# Patient Record
Sex: FEMALE | Race: WHITE | NOT HISPANIC OR LATINO | URBAN - METROPOLITAN AREA
[De-identification: names, ages, dates, MRNs, and addresses within clinical notes are randomized per-mention and may not be internally consistent; named-entity substitution may affect disease eponyms.]

---

## 2023-06-05 ENCOUNTER — INPATIENT (INPATIENT)
Facility: HOSPITAL | Age: 43
LOS: 3 days | Discharge: HOME | DRG: 872 | End: 2023-06-09
Attending: SURGERY | Admitting: SURGERY
Payer: COMMERCIAL

## 2023-06-05 VITALS
HEIGHT: 64 IN | DIASTOLIC BLOOD PRESSURE: 82 MMHG | WEIGHT: 210.1 LBS | OXYGEN SATURATION: 98 % | SYSTOLIC BLOOD PRESSURE: 127 MMHG | RESPIRATION RATE: 18 BRPM | HEART RATE: 117 BPM | TEMPERATURE: 101 F

## 2023-06-05 PROCEDURE — 99285 EMERGENCY DEPT VISIT HI MDM: CPT

## 2023-06-06 DIAGNOSIS — K57.92 DIVERTICULITIS OF INTESTINE, PART UNSPECIFIED, WITHOUT PERFORATION OR ABSCESS WITHOUT BLEEDING: ICD-10-CM

## 2023-06-06 LAB
ALBUMIN SERPL ELPH-MCNC: 3.7 G/DL — SIGNIFICANT CHANGE UP (ref 3.3–5)
ALP SERPL-CCNC: 64 U/L — SIGNIFICANT CHANGE UP (ref 40–120)
ALT FLD-CCNC: 16 U/L — SIGNIFICANT CHANGE UP (ref 10–45)
ANION GAP SERPL CALC-SCNC: 12 MMOL/L — SIGNIFICANT CHANGE UP (ref 5–17)
APPEARANCE UR: CLEAR — SIGNIFICANT CHANGE UP
AST SERPL-CCNC: 16 U/L — SIGNIFICANT CHANGE UP (ref 10–40)
BACTERIA # UR AUTO: NEGATIVE — SIGNIFICANT CHANGE UP
BASOPHILS # BLD AUTO: 0 K/UL — SIGNIFICANT CHANGE UP (ref 0–0.2)
BASOPHILS NFR BLD AUTO: 0 % — SIGNIFICANT CHANGE UP (ref 0–2)
BILIRUB SERPL-MCNC: 0.4 MG/DL — SIGNIFICANT CHANGE UP (ref 0.2–1.2)
BILIRUB UR-MCNC: NEGATIVE — SIGNIFICANT CHANGE UP
BUN SERPL-MCNC: 9 MG/DL — SIGNIFICANT CHANGE UP (ref 7–23)
CALCIUM SERPL-MCNC: 8.5 MG/DL — SIGNIFICANT CHANGE UP (ref 8.4–10.5)
CHLORIDE SERPL-SCNC: 104 MMOL/L — SIGNIFICANT CHANGE UP (ref 96–108)
CO2 SERPL-SCNC: 20 MMOL/L — LOW (ref 22–31)
COLOR SPEC: YELLOW — SIGNIFICANT CHANGE UP
CREAT SERPL-MCNC: 0.65 MG/DL — SIGNIFICANT CHANGE UP (ref 0.5–1.3)
DIFF PNL FLD: NEGATIVE — SIGNIFICANT CHANGE UP
EGFR: 112 ML/MIN/1.73M2 — SIGNIFICANT CHANGE UP
EOSINOPHIL # BLD AUTO: 0 K/UL — SIGNIFICANT CHANGE UP (ref 0–0.5)
EOSINOPHIL NFR BLD AUTO: 0 % — SIGNIFICANT CHANGE UP (ref 0–6)
EPI CELLS # UR: 3 /HPF — SIGNIFICANT CHANGE UP
GLUCOSE SERPL-MCNC: 110 MG/DL — HIGH (ref 70–99)
GLUCOSE UR QL: NEGATIVE — SIGNIFICANT CHANGE UP
HCG SERPL-ACNC: <2 MIU/ML — SIGNIFICANT CHANGE UP
HCT VFR BLD CALC: 37.1 % — SIGNIFICANT CHANGE UP (ref 34.5–45)
HGB BLD-MCNC: 12.4 G/DL — SIGNIFICANT CHANGE UP (ref 11.5–15.5)
HYALINE CASTS # UR AUTO: 0 /LPF — SIGNIFICANT CHANGE UP (ref 0–2)
KETONES UR-MCNC: ABNORMAL
LEUKOCYTE ESTERASE UR-ACNC: NEGATIVE — SIGNIFICANT CHANGE UP
LYMPHOCYTES # BLD AUTO: 16.4 % — SIGNIFICANT CHANGE UP (ref 13–44)
LYMPHOCYTES # BLD AUTO: 2.66 K/UL — SIGNIFICANT CHANGE UP (ref 1–3.3)
MANUAL SMEAR VERIFICATION: SIGNIFICANT CHANGE UP
MCHC RBC-ENTMCNC: 31.2 PG — SIGNIFICANT CHANGE UP (ref 27–34)
MCHC RBC-ENTMCNC: 33.4 GM/DL — SIGNIFICANT CHANGE UP (ref 32–36)
MCV RBC AUTO: 93.2 FL — SIGNIFICANT CHANGE UP (ref 80–100)
MONOCYTES # BLD AUTO: 1.54 K/UL — HIGH (ref 0–0.9)
MONOCYTES NFR BLD AUTO: 9.5 % — SIGNIFICANT CHANGE UP (ref 2–14)
NEUTROPHILS # BLD AUTO: 12.01 K/UL — HIGH (ref 1.8–7.4)
NEUTROPHILS NFR BLD AUTO: 74.1 % — SIGNIFICANT CHANGE UP (ref 43–77)
NITRITE UR-MCNC: NEGATIVE — SIGNIFICANT CHANGE UP
PH UR: 5.5 — SIGNIFICANT CHANGE UP (ref 5–8)
PLAT MORPH BLD: NORMAL — SIGNIFICANT CHANGE UP
PLATELET # BLD AUTO: 225 K/UL — SIGNIFICANT CHANGE UP (ref 150–400)
POTASSIUM SERPL-MCNC: 3.5 MMOL/L — SIGNIFICANT CHANGE UP (ref 3.5–5.3)
POTASSIUM SERPL-SCNC: 3.5 MMOL/L — SIGNIFICANT CHANGE UP (ref 3.5–5.3)
PROT SERPL-MCNC: 6.7 G/DL — SIGNIFICANT CHANGE UP (ref 6–8.3)
PROT UR-MCNC: NEGATIVE — SIGNIFICANT CHANGE UP
RBC # BLD: 3.98 M/UL — SIGNIFICANT CHANGE UP (ref 3.8–5.2)
RBC # FLD: 13.2 % — SIGNIFICANT CHANGE UP (ref 10.3–14.5)
RBC BLD AUTO: NORMAL — SIGNIFICANT CHANGE UP
RBC CASTS # UR COMP ASSIST: 1 /HPF — SIGNIFICANT CHANGE UP (ref 0–4)
SODIUM SERPL-SCNC: 136 MMOL/L — SIGNIFICANT CHANGE UP (ref 135–145)
SP GR SPEC: 1.01 — SIGNIFICANT CHANGE UP (ref 1.01–1.02)
UROBILINOGEN FLD QL: NEGATIVE — SIGNIFICANT CHANGE UP
WBC # BLD: 16.21 K/UL — HIGH (ref 3.8–10.5)
WBC # FLD AUTO: 16.21 K/UL — HIGH (ref 3.8–10.5)
WBC UR QL: 7 /HPF — HIGH (ref 0–5)

## 2023-06-06 PROCEDURE — 74177 CT ABD & PELVIS W/CONTRAST: CPT | Mod: 26,MA

## 2023-06-06 PROCEDURE — 99223 1ST HOSP IP/OBS HIGH 75: CPT

## 2023-06-06 RX ORDER — DEXTROSE MONOHYDRATE, SODIUM CHLORIDE, AND POTASSIUM CHLORIDE 50; .745; 4.5 G/1000ML; G/1000ML; G/1000ML
1000 INJECTION, SOLUTION INTRAVENOUS
Refills: 0 | Status: DISCONTINUED | OUTPATIENT
Start: 2023-06-06 | End: 2023-06-09

## 2023-06-06 RX ORDER — ACETAMINOPHEN 500 MG
1000 TABLET ORAL ONCE
Refills: 0 | Status: COMPLETED | OUTPATIENT
Start: 2023-06-06 | End: 2023-06-06

## 2023-06-06 RX ORDER — SODIUM CHLORIDE 9 MG/ML
1000 INJECTION, SOLUTION INTRAVENOUS ONCE
Refills: 0 | Status: COMPLETED | OUTPATIENT
Start: 2023-06-06 | End: 2023-06-06

## 2023-06-06 RX ORDER — ACETAMINOPHEN 500 MG
1000 TABLET ORAL EVERY 6 HOURS
Refills: 0 | Status: COMPLETED | OUTPATIENT
Start: 2023-06-06 | End: 2023-06-07

## 2023-06-06 RX ORDER — OXYCODONE HYDROCHLORIDE 5 MG/1
5 TABLET ORAL EVERY 4 HOURS
Refills: 0 | Status: DISCONTINUED | OUTPATIENT
Start: 2023-06-06 | End: 2023-06-07

## 2023-06-06 RX ORDER — KETOROLAC TROMETHAMINE 30 MG/ML
15 SYRINGE (ML) INJECTION ONCE
Refills: 0 | Status: DISCONTINUED | OUTPATIENT
Start: 2023-06-06 | End: 2023-06-06

## 2023-06-06 RX ORDER — OXYCODONE HYDROCHLORIDE 5 MG/1
2.5 TABLET ORAL EVERY 4 HOURS
Refills: 0 | Status: DISCONTINUED | OUTPATIENT
Start: 2023-06-06 | End: 2023-06-07

## 2023-06-06 RX ORDER — PIPERACILLIN AND TAZOBACTAM 4; .5 G/20ML; G/20ML
3.38 INJECTION, POWDER, LYOPHILIZED, FOR SOLUTION INTRAVENOUS EVERY 8 HOURS
Refills: 0 | Status: DISCONTINUED | OUTPATIENT
Start: 2023-06-06 | End: 2023-06-09

## 2023-06-06 RX ORDER — AMPICILLIN SODIUM AND SULBACTAM SODIUM 250; 125 MG/ML; MG/ML
3 INJECTION, POWDER, FOR SUSPENSION INTRAMUSCULAR; INTRAVENOUS ONCE
Refills: 0 | Status: COMPLETED | OUTPATIENT
Start: 2023-06-06 | End: 2023-06-06

## 2023-06-06 RX ADMIN — Medication 15 MILLIGRAM(S): at 06:40

## 2023-06-06 RX ADMIN — PIPERACILLIN AND TAZOBACTAM 25 GRAM(S): 4; .5 INJECTION, POWDER, LYOPHILIZED, FOR SOLUTION INTRAVENOUS at 14:04

## 2023-06-06 RX ADMIN — Medication 15 MILLIGRAM(S): at 10:09

## 2023-06-06 RX ADMIN — OXYCODONE HYDROCHLORIDE 5 MILLIGRAM(S): 5 TABLET ORAL at 21:50

## 2023-06-06 RX ADMIN — Medication 400 MILLIGRAM(S): at 13:37

## 2023-06-06 RX ADMIN — Medication 400 MILLIGRAM(S): at 03:59

## 2023-06-06 RX ADMIN — Medication 15 MILLIGRAM(S): at 07:20

## 2023-06-06 RX ADMIN — AMPICILLIN SODIUM AND SULBACTAM SODIUM 3 GRAM(S): 250; 125 INJECTION, POWDER, FOR SUSPENSION INTRAMUSCULAR; INTRAVENOUS at 10:36

## 2023-06-06 RX ADMIN — Medication 400 MILLIGRAM(S): at 23:37

## 2023-06-06 RX ADMIN — AMPICILLIN SODIUM AND SULBACTAM SODIUM 200 GRAM(S): 250; 125 INJECTION, POWDER, FOR SUSPENSION INTRAMUSCULAR; INTRAVENOUS at 09:47

## 2023-06-06 RX ADMIN — PIPERACILLIN AND TAZOBACTAM 25 GRAM(S): 4; .5 INJECTION, POWDER, LYOPHILIZED, FOR SOLUTION INTRAVENOUS at 23:35

## 2023-06-06 RX ADMIN — Medication 15 MILLIGRAM(S): at 10:35

## 2023-06-06 RX ADMIN — DEXTROSE MONOHYDRATE, SODIUM CHLORIDE, AND POTASSIUM CHLORIDE 100 MILLILITER(S): 50; .745; 4.5 INJECTION, SOLUTION INTRAVENOUS at 21:19

## 2023-06-06 RX ADMIN — DEXTROSE MONOHYDRATE, SODIUM CHLORIDE, AND POTASSIUM CHLORIDE 100 MILLILITER(S): 50; .745; 4.5 INJECTION, SOLUTION INTRAVENOUS at 16:32

## 2023-06-06 RX ADMIN — SODIUM CHLORIDE 1000 MILLILITER(S): 9 INJECTION, SOLUTION INTRAVENOUS at 03:58

## 2023-06-06 RX ADMIN — OXYCODONE HYDROCHLORIDE 5 MILLIGRAM(S): 5 TABLET ORAL at 21:20

## 2023-06-06 NOTE — ED ADULT NURSE NOTE - OBJECTIVE STATEMENT
Pt is 42 y/o female, presenting to the ED c/o pelvic pain. Pt reports that she has been having pelvic pain over the past couple of days, worsening today prompting ED visit. As per pt, she has had difficultly walking, sitting, and doing daily tasks due to the pain. Pt also endorses fevers of tMax 101.6. As per pt, she has been taking advil @ home for pain w/ no relief. Nikko IUD placed in 2020, pt endorses regular periods and denies pertinent PMH. Upon assessment, pt AxOx3, sitting up in stretcher and speaking in full sentences. Breathing spontaneously and unlabored, >95% RA. Abdomen soft and nontender to palpation. Pt moves all extremities w/ = strength. Skin is warm, dry, and intact w/ + peripheral pulses. Pt denies SOB, chest pain, n/v/d, dizziness, vision changes, chills, and fever. Safety and comfort measures provided- bed in lowest position, locked, and blanket given.

## 2023-06-06 NOTE — H&P ADULT - NSHPPHYSICALEXAM_GEN_ALL_CORE
Gen: appears well groomed  Lungs: Non labored breathing  Abd: Soft   Ext: Moving all extremities spontaneously  Neuro: A&Ox3 Gen: appears well groomed, well nourished  Lungs: Non labored breathing  Abd: Soft, tender to LLQ, non distended  Ext: Moving all extremities spontaneously  Neuro: A&Ox3

## 2023-06-06 NOTE — ED PROVIDER NOTE - OBJECTIVE STATEMENT
43-year-old female with no known past medical history presents emergency department for 2 days of lower abdominal pain.  Patient states pain is gotten worse over the last 2 days, sharp and cramping in nature.  Pain worse with lying down, walking and coughing.  Patient also endorsing chills and fevers over the last 2 days.  Patient states she has copper IUD that was placed about 3 years ago and she has had abnormal periods since, LMP about 5 weeks ago.  Patient sexually active with 1 partner.  Denies history STIs or known exposures to STIs.  Patient denies headache, vision changes, chest pain, trouble breathing/vomiting, diarrhea/constipation, dysuria, hematuria, vaginal bleeding or discharge.

## 2023-06-06 NOTE — ED PROVIDER NOTE - PHYSICAL EXAMINATION
Constitutional: VS reviewed. Alert and orientedx3, pt appears uncomfortable.   HEENT: Atraumatic, EOMI  CV: Tachycardic  Lungs: Clear and equal bilaterally, no wheezes, rales or crackles  Abdomen: Soft, nondistended, TTP in b/l lower quadrants w/o rebound or guarding   MSK: No deformities  Skin: Warm and dry. As visualized no rashes, lesions, bruising or erythema  Neuro: Appears nonfocal  Lymph: No pitting edema in extremities.

## 2023-06-06 NOTE — ED PROVIDER NOTE - PROGRESS NOTE DETAILS
CT scan shows acute diverticulitis with microperforations.  Unasyn started.  Surgery consulted, will see patient.

## 2023-06-06 NOTE — ED PROVIDER NOTE - ATTENDING CONTRIBUTION TO CARE
Patient evaluated for lower abdominal pain.  Patient had CT scans that showed diverticulitis with microperforation.  Patient received IV antibiotics, IV fluids, IV pain meds.  Patient remained hemodynamically stable in the ED.  Patient was evaluated and admitted by general surgery for further care.

## 2023-06-06 NOTE — H&P ADULT - ASSESSMENT
43-year-old female with no pmhx presents to ER with chief complaint of 2 days of lower abdominal pain. CT c/w with acute diverticulitis with microperforation.    - IV Zosyn   - NPO/IVF  - Follow AM labs   - Follow abdominal exams    .[phone]  [facility]    43-year-old female with no pmhx presents to ER with chief complaint of 2 days of lower abdominal pain. CT c/w with acute diverticulitis with microperforation.    - IV Zosyn   - NPO/IVF  - Follow AM labs   - Follow abdominal exams    .[phone]  [facility]

## 2023-06-06 NOTE — ED ADULT NURSE NOTE - CAS EDP DISCH DISPOSITION ADMI
[facility] [place]/Surgery Cyclosporine Counseling:  I discussed with the patient the risks of cyclosporine including but not limited to hypertension, gingival hyperplasia,myelosuppression, immunosuppression, liver damage, kidney damage, neurotoxicity, lymphoma, and serious infections. The patient understands that monitoring is required including baseline blood pressure, CBC, CMP, lipid panel and uric acid, and then 1-2 times monthly CMP and blood pressure.

## 2023-06-06 NOTE — ED PROVIDER NOTE - CLINICAL SUMMARY MEDICAL DECISION MAKING FREE TEXT BOX
43-year-old female with no known past medical history presents emergency department for 2 days of worsening lower abdominal pain and fevers. Pain worse with laying flat, walking and coughing. + TTP of b/l lower quadrants without rebound or guarding. Nontoxic appearing. Febrile and tachycardic on arrival. Differentials include but not limited to ruptured ovarian torsion, TOA, appendicitis, PID. Plan for labs, CT, pain meds. Dispo pending lab and imaging.

## 2023-06-06 NOTE — H&P ADULT - NSHPLABSRESULTS_GEN_ALL_CORE
CT ABD/PELVIS    PROCEDURE DATE:  06/06/2023      INTERPRETATION:  CLINICAL INFORMATION: Pelvic pain with fevers.  FINDINGS:  LOWER CHEST: Within normal limits.    LIVER: Elongated appearance of the right lobe, which may be due to a   Riedel's lobe versus hepatomegaly.  BILE DUCTS: Normal caliber.  GALLBLADDER: Within normal limits.  SPLEEN: Within normal limits.  PANCREAS: Withinnormal limits.  ADRENALS: Within normal limits.  KIDNEYS/URETERS: Within normal limits.    BLADDER: Within normal limits.  REPRODUCTIVE ORGANS: Intrauterine device in place.    BOWEL: Sigmoid colonic diverticula. Wall thickening of the sigmoid colon   with surrounding fat stranding, which appears centered about an inflamed   diverticulum, concerning for acute diverticulitis. Foci of gas within the   adjacent sigmoid mesentery, concerning for microperforation. No   associated fluid collection.    Small hiatal hernia. No bowel obstruction. Appendix is normal.    PERITONEUM: No ascites. Small amount of free air within the sigmoid   mesentery.  VESSELS: Within normal limits.  RETROPERITONEUM/LYMPH NODES: No lymphadenopathy.  ABDOMINAL WALL: Tiny fat-containing umbilical hernia.  BONES: Mild degenerative changes.    IMPRESSION:  Acute sigmoid diverticulitis. Adjacent foci of free gas within the   sigmoid mesentery, concerning for microperforation. No associated   drainable fluid collection.      --- End of Report ---       LEEANNA GREY MD; Resident Radiologist  This document has been electronically signed.  DORIE IBARRA MD; Attending Radiologist  This document has been electronically signed. Jun 6 2023  8:26AM

## 2023-06-06 NOTE — H&P ADULT - HISTORY OF PRESENT ILLNESS
43-year-old female with no pmhx presents to ER with chief complaint of 2 days of lower abdominal pain.  Patient reports sharp and cramping in nature.  Pain worse with lying down, walking and coughing.  Patient also endorsing chills and fevers over the last 2 days.  Patient states she has copper IUD that was placed about 3 years ago and she has had abnormal periods since, LMP about 5 weeks ago.  Patient sexually active with 1 partner.  Denies history STIs or known exposures to STIs.  Patient denies headache, vision changes, chest pain, trouble breathing/vomiting, diarrhea/constipation, dysuria, hematuria, vaginal bleeding or discharge.

## 2023-06-07 LAB
ANION GAP SERPL CALC-SCNC: 11 MMOL/L — SIGNIFICANT CHANGE UP (ref 5–17)
BUN SERPL-MCNC: 6 MG/DL — LOW (ref 7–23)
CALCIUM SERPL-MCNC: 8.6 MG/DL — SIGNIFICANT CHANGE UP (ref 8.4–10.5)
CHLORIDE SERPL-SCNC: 104 MMOL/L — SIGNIFICANT CHANGE UP (ref 96–108)
CO2 SERPL-SCNC: 24 MMOL/L — SIGNIFICANT CHANGE UP (ref 22–31)
CREAT SERPL-MCNC: 0.69 MG/DL — SIGNIFICANT CHANGE UP (ref 0.5–1.3)
CULTURE RESULTS: SIGNIFICANT CHANGE UP
EGFR: 110 ML/MIN/1.73M2 — SIGNIFICANT CHANGE UP
GLUCOSE SERPL-MCNC: 96 MG/DL — SIGNIFICANT CHANGE UP (ref 70–99)
HCT VFR BLD CALC: 37.5 % — SIGNIFICANT CHANGE UP (ref 34.5–45)
HGB BLD-MCNC: 12.6 G/DL — SIGNIFICANT CHANGE UP (ref 11.5–15.5)
MAGNESIUM SERPL-MCNC: 2 MG/DL — SIGNIFICANT CHANGE UP (ref 1.6–2.6)
MCHC RBC-ENTMCNC: 31.3 PG — SIGNIFICANT CHANGE UP (ref 27–34)
MCHC RBC-ENTMCNC: 33.6 GM/DL — SIGNIFICANT CHANGE UP (ref 32–36)
MCV RBC AUTO: 93.3 FL — SIGNIFICANT CHANGE UP (ref 80–100)
NRBC # BLD: 0 /100 WBCS — SIGNIFICANT CHANGE UP (ref 0–0)
PHOSPHATE SERPL-MCNC: 3.3 MG/DL — SIGNIFICANT CHANGE UP (ref 2.5–4.5)
PLATELET # BLD AUTO: 222 K/UL — SIGNIFICANT CHANGE UP (ref 150–400)
POTASSIUM SERPL-MCNC: 4.3 MMOL/L — SIGNIFICANT CHANGE UP (ref 3.5–5.3)
POTASSIUM SERPL-SCNC: 4.3 MMOL/L — SIGNIFICANT CHANGE UP (ref 3.5–5.3)
RBC # BLD: 4.02 M/UL — SIGNIFICANT CHANGE UP (ref 3.8–5.2)
RBC # FLD: 13.2 % — SIGNIFICANT CHANGE UP (ref 10.3–14.5)
SODIUM SERPL-SCNC: 139 MMOL/L — SIGNIFICANT CHANGE UP (ref 135–145)
SPECIMEN SOURCE: SIGNIFICANT CHANGE UP
WBC # BLD: 10.15 K/UL — SIGNIFICANT CHANGE UP (ref 3.8–10.5)
WBC # FLD AUTO: 10.15 K/UL — SIGNIFICANT CHANGE UP (ref 3.8–10.5)

## 2023-06-07 PROCEDURE — 99232 SBSQ HOSP IP/OBS MODERATE 35: CPT

## 2023-06-07 RX ORDER — ENOXAPARIN SODIUM 100 MG/ML
40 INJECTION SUBCUTANEOUS EVERY 24 HOURS
Refills: 0 | Status: DISCONTINUED | OUTPATIENT
Start: 2023-06-07 | End: 2023-06-09

## 2023-06-07 RX ORDER — KETOROLAC TROMETHAMINE 30 MG/ML
15 SYRINGE (ML) INJECTION ONCE
Refills: 0 | Status: DISCONTINUED | OUTPATIENT
Start: 2023-06-07 | End: 2023-06-07

## 2023-06-07 RX ORDER — ACETAMINOPHEN 500 MG
975 TABLET ORAL EVERY 6 HOURS
Refills: 0 | Status: DISCONTINUED | OUTPATIENT
Start: 2023-06-07 | End: 2023-06-09

## 2023-06-07 RX ADMIN — ENOXAPARIN SODIUM 40 MILLIGRAM(S): 100 INJECTION SUBCUTANEOUS at 14:09

## 2023-06-07 RX ADMIN — Medication 400 MILLIGRAM(S): at 06:47

## 2023-06-07 RX ADMIN — DEXTROSE MONOHYDRATE, SODIUM CHLORIDE, AND POTASSIUM CHLORIDE 100 MILLILITER(S): 50; .745; 4.5 INJECTION, SOLUTION INTRAVENOUS at 17:13

## 2023-06-07 RX ADMIN — Medication 1000 MILLIGRAM(S): at 07:17

## 2023-06-07 RX ADMIN — Medication 15 MILLIGRAM(S): at 21:10

## 2023-06-07 RX ADMIN — Medication 15 MILLIGRAM(S): at 20:33

## 2023-06-07 RX ADMIN — PIPERACILLIN AND TAZOBACTAM 25 GRAM(S): 4; .5 INJECTION, POWDER, LYOPHILIZED, FOR SOLUTION INTRAVENOUS at 06:45

## 2023-06-07 RX ADMIN — PIPERACILLIN AND TAZOBACTAM 25 GRAM(S): 4; .5 INJECTION, POWDER, LYOPHILIZED, FOR SOLUTION INTRAVENOUS at 21:46

## 2023-06-07 RX ADMIN — Medication 1000 MILLIGRAM(S): at 12:06

## 2023-06-07 RX ADMIN — PIPERACILLIN AND TAZOBACTAM 25 GRAM(S): 4; .5 INJECTION, POWDER, LYOPHILIZED, FOR SOLUTION INTRAVENOUS at 14:09

## 2023-06-07 RX ADMIN — Medication 1000 MILLIGRAM(S): at 18:17

## 2023-06-07 RX ADMIN — Medication 400 MILLIGRAM(S): at 18:02

## 2023-06-07 RX ADMIN — Medication 1000 MILLIGRAM(S): at 00:07

## 2023-06-07 RX ADMIN — Medication 400 MILLIGRAM(S): at 11:51

## 2023-06-07 NOTE — PATIENT PROFILE ADULT - HAVE YOU RECEIVED AT LEAST TWO PFIZER AND/OR MODERNA VACCINATIONS (IN ANY COMBINATION) AND/OR ONE JOHNSON & JOHNSON VACCINATION?
You can access the FollowMyHealth Patient Portal offered by [facility] by registering at the following website: [email or web address]. By joining [employer]’s FollowMyHealth portal, you will also be able to view your health information using other applications (apps) compatible with our system.
Yes

## 2023-06-07 NOTE — PROGRESS NOTE ADULT - ASSESSMENT
43-year-old female with no pmhx presents to ER with chief complaint of 2 days of lower abdominal pain. CT c/w with acute diverticulitis with microperforation.    Plan:  - IV Zosyn   - NPO/IVF  - Follow AM labs   - Follow abdominal exams      [facility]   [id number] 43-year-old female with no pmhx presents to ER with chief complaint of 2 days of lower abdominal pain. CT c/w with acute diverticulitis with microperforation.    Plan:  - IV Zosyn   - advance to sips/chips  - Follow AM labs and replete prn   - Follow abdominal exams      [facility]   [id number]

## 2023-06-08 LAB
ANION GAP SERPL CALC-SCNC: 14 MMOL/L — SIGNIFICANT CHANGE UP (ref 5–17)
BUN SERPL-MCNC: <4 MG/DL — LOW (ref 7–23)
CALCIUM SERPL-MCNC: 9 MG/DL — SIGNIFICANT CHANGE UP (ref 8.4–10.5)
CHLORIDE SERPL-SCNC: 106 MMOL/L — SIGNIFICANT CHANGE UP (ref 96–108)
CO2 SERPL-SCNC: 20 MMOL/L — LOW (ref 22–31)
CREAT SERPL-MCNC: 0.68 MG/DL — SIGNIFICANT CHANGE UP (ref 0.5–1.3)
EGFR: 111 ML/MIN/1.73M2 — SIGNIFICANT CHANGE UP
GLUCOSE SERPL-MCNC: 107 MG/DL — HIGH (ref 70–99)
HCT VFR BLD CALC: 36.8 % — SIGNIFICANT CHANGE UP (ref 34.5–45)
HGB BLD-MCNC: 12.4 G/DL — SIGNIFICANT CHANGE UP (ref 11.5–15.5)
MAGNESIUM SERPL-MCNC: 2 MG/DL — SIGNIFICANT CHANGE UP (ref 1.6–2.6)
MCHC RBC-ENTMCNC: 31.2 PG — SIGNIFICANT CHANGE UP (ref 27–34)
MCHC RBC-ENTMCNC: 33.7 GM/DL — SIGNIFICANT CHANGE UP (ref 32–36)
MCV RBC AUTO: 92.7 FL — SIGNIFICANT CHANGE UP (ref 80–100)
NRBC # BLD: 0 /100 WBCS — SIGNIFICANT CHANGE UP (ref 0–0)
PHOSPHATE SERPL-MCNC: 3.4 MG/DL — SIGNIFICANT CHANGE UP (ref 2.5–4.5)
PLATELET # BLD AUTO: 264 K/UL — SIGNIFICANT CHANGE UP (ref 150–400)
POTASSIUM SERPL-MCNC: 3.7 MMOL/L — SIGNIFICANT CHANGE UP (ref 3.5–5.3)
POTASSIUM SERPL-SCNC: 3.7 MMOL/L — SIGNIFICANT CHANGE UP (ref 3.5–5.3)
RBC # BLD: 3.97 M/UL — SIGNIFICANT CHANGE UP (ref 3.8–5.2)
RBC # FLD: 13.1 % — SIGNIFICANT CHANGE UP (ref 10.3–14.5)
SODIUM SERPL-SCNC: 140 MMOL/L — SIGNIFICANT CHANGE UP (ref 135–145)
WBC # BLD: 8.69 K/UL — SIGNIFICANT CHANGE UP (ref 3.8–10.5)
WBC # FLD AUTO: 8.69 K/UL — SIGNIFICANT CHANGE UP (ref 3.8–10.5)

## 2023-06-08 PROCEDURE — 99232 SBSQ HOSP IP/OBS MODERATE 35: CPT

## 2023-06-08 RX ORDER — LACTOBACILLUS ACIDOPHILUS 100MM CELL
1 CAPSULE ORAL DAILY
Refills: 0 | Status: DISCONTINUED | OUTPATIENT
Start: 2023-06-08 | End: 2023-06-09

## 2023-06-08 RX ORDER — POTASSIUM CHLORIDE 20 MEQ
20 PACKET (EA) ORAL ONCE
Refills: 0 | Status: COMPLETED | OUTPATIENT
Start: 2023-06-08 | End: 2023-06-08

## 2023-06-08 RX ADMIN — PIPERACILLIN AND TAZOBACTAM 25 GRAM(S): 4; .5 INJECTION, POWDER, LYOPHILIZED, FOR SOLUTION INTRAVENOUS at 21:17

## 2023-06-08 RX ADMIN — ENOXAPARIN SODIUM 40 MILLIGRAM(S): 100 INJECTION SUBCUTANEOUS at 14:58

## 2023-06-08 RX ADMIN — PIPERACILLIN AND TAZOBACTAM 25 GRAM(S): 4; .5 INJECTION, POWDER, LYOPHILIZED, FOR SOLUTION INTRAVENOUS at 14:57

## 2023-06-08 RX ADMIN — PIPERACILLIN AND TAZOBACTAM 25 GRAM(S): 4; .5 INJECTION, POWDER, LYOPHILIZED, FOR SOLUTION INTRAVENOUS at 06:16

## 2023-06-08 RX ADMIN — DEXTROSE MONOHYDRATE, SODIUM CHLORIDE, AND POTASSIUM CHLORIDE 100 MILLILITER(S): 50; .745; 4.5 INJECTION, SOLUTION INTRAVENOUS at 14:58

## 2023-06-08 RX ADMIN — DEXTROSE MONOHYDRATE, SODIUM CHLORIDE, AND POTASSIUM CHLORIDE 100 MILLILITER(S): 50; .745; 4.5 INJECTION, SOLUTION INTRAVENOUS at 21:17

## 2023-06-08 RX ADMIN — Medication 20 MILLIEQUIVALENT(S): at 15:00

## 2023-06-08 NOTE — PROGRESS NOTE ADULT - ATTENDING COMMENTS
Date of Service 6/7/23    Agree with E&M as outlined above.    Diverticulitis w/perf, IV abx observation
Agree with E&M above    CLinical improvement of her Diverticulitis

## 2023-06-08 NOTE — PROGRESS NOTE ADULT - ASSESSMENT
43-year-old female with no pmhx presents to ER with chief complaint of 2 days of lower abdominal pain. CT c/w with acute diverticulitis with microperforation.    Plan:  - IV Zosyn   -  tolerating CLD- might advance diet if tolerates breakfast   - Follow AM labs and replete prn   - Follow abdominal exams      [facility]   [id number]

## 2023-06-08 NOTE — PROGRESS NOTE ADULT - TIME BILLING
Evaluation, chart review, treatment, care coordination
Evaluation, chart review, care coordination, treatment

## 2023-06-09 ENCOUNTER — TRANSCRIPTION ENCOUNTER (OUTPATIENT)
Age: 43
End: 2023-06-09

## 2023-06-09 VITALS
HEART RATE: 53 BPM | SYSTOLIC BLOOD PRESSURE: 113 MMHG | TEMPERATURE: 98 F | OXYGEN SATURATION: 98 % | DIASTOLIC BLOOD PRESSURE: 79 MMHG | RESPIRATION RATE: 18 BRPM

## 2023-06-09 LAB
ANION GAP SERPL CALC-SCNC: 11 MMOL/L — SIGNIFICANT CHANGE UP (ref 5–17)
BUN SERPL-MCNC: 5 MG/DL — LOW (ref 7–23)
CALCIUM SERPL-MCNC: 9 MG/DL — SIGNIFICANT CHANGE UP (ref 8.4–10.5)
CHLORIDE SERPL-SCNC: 105 MMOL/L — SIGNIFICANT CHANGE UP (ref 96–108)
CO2 SERPL-SCNC: 24 MMOL/L — SIGNIFICANT CHANGE UP (ref 22–31)
CREAT SERPL-MCNC: 0.85 MG/DL — SIGNIFICANT CHANGE UP (ref 0.5–1.3)
EGFR: 87 ML/MIN/1.73M2 — SIGNIFICANT CHANGE UP
GLUCOSE SERPL-MCNC: 106 MG/DL — HIGH (ref 70–99)
HCT VFR BLD CALC: 36.8 % — SIGNIFICANT CHANGE UP (ref 34.5–45)
HGB BLD-MCNC: 12.3 G/DL — SIGNIFICANT CHANGE UP (ref 11.5–15.5)
MAGNESIUM SERPL-MCNC: 2 MG/DL — SIGNIFICANT CHANGE UP (ref 1.6–2.6)
MCHC RBC-ENTMCNC: 31.5 PG — SIGNIFICANT CHANGE UP (ref 27–34)
MCHC RBC-ENTMCNC: 33.4 GM/DL — SIGNIFICANT CHANGE UP (ref 32–36)
MCV RBC AUTO: 94.4 FL — SIGNIFICANT CHANGE UP (ref 80–100)
NRBC # BLD: 0 /100 WBCS — SIGNIFICANT CHANGE UP (ref 0–0)
PHOSPHATE SERPL-MCNC: 3.8 MG/DL — SIGNIFICANT CHANGE UP (ref 2.5–4.5)
PLATELET # BLD AUTO: 253 K/UL — SIGNIFICANT CHANGE UP (ref 150–400)
POTASSIUM SERPL-MCNC: 4.1 MMOL/L — SIGNIFICANT CHANGE UP (ref 3.5–5.3)
POTASSIUM SERPL-SCNC: 4.1 MMOL/L — SIGNIFICANT CHANGE UP (ref 3.5–5.3)
RBC # BLD: 3.9 M/UL — SIGNIFICANT CHANGE UP (ref 3.8–5.2)
RBC # FLD: 13 % — SIGNIFICANT CHANGE UP (ref 10.3–14.5)
SODIUM SERPL-SCNC: 140 MMOL/L — SIGNIFICANT CHANGE UP (ref 135–145)
WBC # BLD: 9.65 K/UL — SIGNIFICANT CHANGE UP (ref 3.8–10.5)
WBC # FLD AUTO: 9.65 K/UL — SIGNIFICANT CHANGE UP (ref 3.8–10.5)

## 2023-06-09 PROCEDURE — 85027 COMPLETE CBC AUTOMATED: CPT

## 2023-06-09 PROCEDURE — 80053 COMPREHEN METABOLIC PANEL: CPT

## 2023-06-09 PROCEDURE — 74177 CT ABD & PELVIS W/CONTRAST: CPT | Mod: MA

## 2023-06-09 PROCEDURE — 87086 URINE CULTURE/COLONY COUNT: CPT

## 2023-06-09 PROCEDURE — 85025 COMPLETE CBC W/AUTO DIFF WBC: CPT

## 2023-06-09 PROCEDURE — 99285 EMERGENCY DEPT VISIT HI MDM: CPT

## 2023-06-09 PROCEDURE — 81001 URINALYSIS AUTO W/SCOPE: CPT

## 2023-06-09 PROCEDURE — 99231 SBSQ HOSP IP/OBS SF/LOW 25: CPT

## 2023-06-09 PROCEDURE — 83735 ASSAY OF MAGNESIUM: CPT

## 2023-06-09 PROCEDURE — 96365 THER/PROPH/DIAG IV INF INIT: CPT

## 2023-06-09 PROCEDURE — 84100 ASSAY OF PHOSPHORUS: CPT

## 2023-06-09 PROCEDURE — 96376 TX/PRO/DX INJ SAME DRUG ADON: CPT

## 2023-06-09 PROCEDURE — 80048 BASIC METABOLIC PNL TOTAL CA: CPT

## 2023-06-09 PROCEDURE — 96375 TX/PRO/DX INJ NEW DRUG ADDON: CPT

## 2023-06-09 PROCEDURE — 36415 COLL VENOUS BLD VENIPUNCTURE: CPT

## 2023-06-09 PROCEDURE — 84702 CHORIONIC GONADOTROPIN TEST: CPT

## 2023-06-09 RX ORDER — ACETAMINOPHEN 500 MG
3 TABLET ORAL
Qty: 0 | Refills: 0 | DISCHARGE
Start: 2023-06-09

## 2023-06-09 RX ORDER — LACTOBACILLUS ACIDOPHILUS 100MM CELL
1 CAPSULE ORAL
Qty: 14 | Refills: 0
Start: 2023-06-09 | End: 2023-06-22

## 2023-06-09 RX ADMIN — Medication 975 MILLIGRAM(S): at 09:57

## 2023-06-09 RX ADMIN — PIPERACILLIN AND TAZOBACTAM 25 GRAM(S): 4; .5 INJECTION, POWDER, LYOPHILIZED, FOR SOLUTION INTRAVENOUS at 05:05

## 2023-06-09 NOTE — DISCHARGE NOTE NURSING/CASE MANAGEMENT/SOCIAL WORK - PATIENT PORTAL LINK FT
You can access the FollowMyHealth Patient Portal offered by [facility] by registering at the following website: [email or web address]. By joining [employer]’s FollowMyHealth portal, you will also be able to view your health information using other applications (apps) compatible with our system.

## 2023-06-09 NOTE — PROGRESS NOTE ADULT - ASSESSMENT
abdominal pain. CT c/w with acute diverticulitis with microperforation.    Plan:  - IV Zosyn   -  tolerating CLD- might advance diet if tolerates breakfast   - Follow AM labs and replete prn   - Discharge home today      [facility]   [id number]

## 2023-06-09 NOTE — DISCHARGE NOTE PROVIDER - NSDCMRMEDTOKEN_GEN_ALL_CORE_FT
acetaminophen 325 mg oral tablet: 3 tab(s) orally every 6 hours As needed Temp greater or equal to 38C (100.4F), Mild Pain (1 - 3)

## 2023-06-09 NOTE — DISCHARGE NOTE PROVIDER - HOSPITAL COURSE
43-year-old female with no pmhx presents to ER with chief complaint of 2 days of lower abdominal pain.  Patient reports sharp and cramping in nature.  Pain worse with lying down, walking and coughing.  Patient also endorsing chills and fevers over the last 2 days.  Patient states she has copper IUD that was placed about 3 years ago and she has had abnormal periods since, LMP about 5 weeks ago.  Patient sexually active with 1 partner.  Denies history STIs or known exposures to STIs.  Patient denies headache, vision changes, chest pain, trouble breathing/vomiting, diarrhea/constipation, dysuria, hematuria, vaginal bleeding or discharge.     In the ED, CT scan showed Acute sigmoid diverticulitis. Adjacent foci of free gas within the sigmoid mesentery, concerning for microperforation. No associated drainable fluid collection.    Patient was admitted to surgery for further management.  Patient was started on IV antibiotics and made NPO.  As pain improved, patient's diet was advanced as tolerated.      On day of discharge, patient was tolerating regular diet, pain was controlled and she was ambulating.     She is to follow up outpatient with Dr. Topete in 1-2 weeks, as well as complete her antibiotics for a total of 14 days.

## 2023-06-09 NOTE — DISCHARGE NOTE PROVIDER - DID THE PATIENT PRESENT WITH OR WAS TREATED FOR MALNUTRITION DURING THIS ADMISSION
Left message for patient to return call to review pre op instructions. Need to confirm that she took her last dose of metoprolol on 9/24/18, as instructed. Reminded to fast after 12mn tonight and be here (3rd floor SSCU) at 9am tomorrow. Call back # left for confirmation.  
No

## 2023-06-09 NOTE — DISCHARGE NOTE PROVIDER - NSDCCPCAREPLAN_GEN_ALL_CORE_FT
PRINCIPAL DISCHARGE DIAGNOSIS  Diagnosis: Diverticulitis  Assessment and Plan of Treatment: MEDICATIONS:  Please complete your antibiotics as prescribed.  You may take probiotics as needed/as desired while you are on antibiotics. You may take tylenol for pain as neededd.   DIET: Low fiber diet  NOTIFY YOUR SURGEON IF: You have any rectal bleeding that does not stop, fever (over 100.4 F) or chills, persistent nausea/vomiting with inability to tolerate food or liquids, develop new abdominal pain.   FOLLOW-UP:  1. Please call to make a follow-up appointment within 1-2 weeks with Dr. Topete      
<<<RESIDENT DISCHARGE NOTE>>>         ANGEL MAC    MRN-[id number]        VITAL SIGNS:    T(F): 98.7 (07-10-20 @ 05:10), Max: 98.8 (07-09-20 @ 21:14)    HR: 102 (07-10-20 @ 08:55)    BP: 153/102 (07-10-20 @ 08:55)    SpO2: 98% (07-10-20 @ 08:25)            PHYSICAL EXAMINATION:    GEN: No acute distress    HEENT: normocephalic, atraumatic, aniceteric, tesio insertion site on R side intact and clean    LUNGS: Clear to auscultation bilaterally, no rales/wheezing/ rhonchi    HEART: S1/S2 present. RRR, no murmurs    ABD: Soft, non-tender, non-distended. Bowel sounds present    EXT: Warm, well perfused, skin color appropriate for ethnicity, +distal pulses, +motor/sensory fnxn intact x all 4 ext's.     NEURO: AAOX3, normal affect        TEST RESULTS:                            7.7      7.12  )-----------( 160      ( 10 Jul 2020 05:20 )               23.5             07-10        141  |  101  |  58<H>    ----------------------------<  121<H>    3.7   |  24  |  9.4<HH>        Ca    7.0<L>      10 Jul 2020 05:20    Phos  4.7     07-10    Mg     2.3     07-10        TPro  5.9<L>  /  Alb  3.8  /  TBili  <0.2  /  DBili  x   /  AST  7   /  ALT  <5  /  AlkPhos  50  07-10            FINAL DISCHARGE INTERVIEW:  Resident(s) Present: (Name: DEVIN Lugo MD)        DISCHARGE MEDICATION RECONCILIATION  reviewed with Attending (Name: DEMARCO Valera MD)        DISPOSITION:   [ x ] Home,    [  ] Home with Visiting Nursing Services,   [    ]  SNF/ NH,    [   ] Acute Rehab (4A),   [   ] Other (Specify:_________)

## 2023-06-09 NOTE — PROGRESS NOTE ADULT - SUBJECTIVE AND OBJECTIVE BOX
SURGERY DAILY PROGRESS NOTE:         SUBJECTIVE/ROS: Patient seen and examined at bedside.  No events overnight. Complaining of abdominal pain and diarrhea.    Denies nausea, vomiting, chest pain, shortness of breath.  Has not passed flatus as of yet.          MEDICATIONS  (STANDING):  acetaminophen   IVPB .. 1000 milliGRAM(s) IV Intermittent every 6 hours  dextrose 5% + sodium chloride 0.45% with potassium chloride 20 mEq/L 1000 milliLiter(s) (100 mL/Hr) IV Continuous <Continuous>  piperacillin/tazobactam IVPB.. 3.375 Gram(s) IV Intermittent every 8 hours    MEDICATIONS  (PRN):  oxyCODONE    IR 5 milliGRAM(s) Oral every 4 hours PRN Severe Pain (7 - 10)  oxyCODONE    IR 2.5 milliGRAM(s) Oral every 4 hours PRN Moderate Pain (4 - 6)      OBJECTIVE:    Vital Signs Last 24 Hrs  T(C): 36.7 (2023 06:05), Max: 37.1 (2023 19:50)  T(F): 98 (2023 06:05), Max: 98.7 (2023 19:50)  HR: 75 (2023 06:05) (73 - 83)  BP: 125/83 (2023 06:05) (106/72 - 125/83)  BP(mean): --  RR: 18 (2023 06:05) (18 - 20)  SpO2: 97% (2023 06:05) (97% - 98%)    Parameters below as of 2023 06:05  Patient On (Oxygen Delivery Method): room air            I&O's Detail    2023 07:01  -  2023 07:00  --------------------------------------------------------  IN:    dextrose 5% + sodium chloride 0.45% w/ Additives: 1100 mL    IV PiggyBack: 200 mL    IV PiggyBack: 200 mL  Total IN: 1500 mL    OUT:    Oral Fluid: 0 mL    Voided (mL): 1200 mL  Total OUT: 1200 mL    Total NET: 300 mL      PHYSICAL EXAM:  General: laying in bed, NAD  CV: S1, S2  Respiratory: non-labored   Abdomen: soft, nondistended, TTP to LLQ  Ext: no gross deformities    LABS:                        12.6   10.15 )-----------( 222      ( 2023 07:29 )             37.5     06-07    139  |  104  |  6<L>  ----------------------------<  96  4.3   |  24  |  0.69    Ca    8.6      2023 07:29  Phos  3.3     06-07  Mg     2.0     06-07    TPro  6.7  /  Alb  3.7  /  TBili  0.4  /  DBili  x   /  AST  16  /  ALT  16  /  AlkPhos  64  06-06      Urinalysis Basic - ( 2023 04:32 )    Color: Yellow / Appearance: Clear / S.015 / pH: x  Gluc: x / Ketone: Moderate  / Bili: Negative / Urobili: Negative   Blood: x / Protein: Negative / Nitrite: Negative   Leuk Esterase: Negative / RBC: 1 /hpf / WBC 7 /HPF   Sq Epi: x / Non Sq Epi: x / Bacteria: Negative    
Surgery Daily Progress Note  =====================================================  SUBJECTIVE: A 43y Female Patient seen and examined at bedside on AM rounds.     ALLERGIES:  No Known Allergies      --------------------------------------------------------------------------------------    MEDICATIONS:    Neurologic Medications  acetaminophen     Tablet .. 975 milliGRAM(s) Oral every 6 hours PRN Temp greater or equal to 38C (100.4F), Mild Pain (1 - 3)    Respiratory Medications    Cardiovascular Medications    Gastrointestinal Medications    Genitourinary Medications    Hematologic/Oncologic Medications  enoxaparin Injectable 40 milliGRAM(s) SubCutaneous every 24 hours    Antimicrobial/Immunologic Medications  piperacillin/tazobactam IVPB.. 3.375 Gram(s) IV Intermittent every 8 hours    Endocrine/Metabolic Medications    Topical/Other Medications  lactobacillus acidophilus 1 Tablet(s) Oral daily    --------------------------------------------------------------------------------------    VITAL SIGNS:  No Known Allergies      T(C): 36.9 (06-09-23 @ 09:27), Max: 36.9 (06-08-23 @ 23:56)  HR: 53 (06-09-23 @ 09:27) (53 - 83)  BP: 113/79 (06-09-23 @ 09:27) (103/73 - 129/74)  RR: 18 (06-09-23 @ 09:27) (18 - 18)  SpO2: 98% (06-09-23 @ 09:27) (93% - 99%)  --------------------------------------------------------------------------------------    EXAM    General: laying in bed, NAD  CV: S1, S2  Respiratory: non-labored   Abdomen: soft, nondistended, TTP to LLQ  Ext: no gross deformities    --------------------------------------------------------------------------------------    I&Os  T(C): 36.9 (06-09-23 @ 09:27), Max: 36.9 (06-08-23 @ 23:56)  HR: 53 (06-09-23 @ 09:27) (53 - 83)  BP: 113/79 (06-09-23 @ 09:27) (103/73 - 129/74)  RR: 18 (06-09-23 @ 09:27) (18 - 18)  SpO2: 98% (06-09-23 @ 09:27) (93% - 99%)  --------------------------------------------------------------------------------------    LABS                          12.3   9.65  )-----------( 253      ( 09 Jun 2023 06:51 )             36.8     06-09    140  |  105  |  5<L>  ----------------------------<  106<H>  4.1   |  24  |  0.85    Ca    9.0      09 Jun 2023 06:50  Phos  3.8     06-09  Mg     2.0     06-09 06-08-23 @ 07:01  -  06-09-23 @ 07:00  --------------------------------------------------------  IN: 2110 mL / OUT: 3110 mL / NET: -1000 mL    06-09-23 @ 07:01  -  06-09-23 @ 10:42  --------------------------------------------------------  IN: 240 mL / OUT: 300 mL / NET: -60 mL      acetaminophen     Tablet .. 975 milliGRAM(s) Oral every 6 hours PRN  enoxaparin Injectable 40 milliGRAM(s) SubCutaneous every 24 hours  lactobacillus acidophilus 1 Tablet(s) Oral daily  piperacillin/tazobactam IVPB.. 3.375 Gram(s) IV Intermittent every 8 hours      RADIOLOGY, EKG & ADDITIONAL TESTS: Reviewed. 
SURGERY DAILY PROGRESS NOTE:     Overnight Events:  No acute events overnight    SUBJECTIVE: Patient seen and evaluated on AM rounds. Pt is resting comfortably in bed however complaining of localized discomfort/cramping of abdomen. Denies nausea, vomiting, chest pain, shortness of breath. Passing gas and having bowel movements.      OBJECTIVE:  Vital Signs Last 24 Hrs  T(C): 36.8 (08 Jun 2023 06:30), Max: 37.2 (07 Jun 2023 16:44)  T(F): 98.3 (08 Jun 2023 06:30), Max: 99 (07 Jun 2023 16:44)  HR: 72 (08 Jun 2023 06:30) (72 - 81)  BP: 109/72 (08 Jun 2023 06:30) (100/65 - 130/81)  BP(mean): --  RR: 18 (08 Jun 2023 06:30) (18 - 18)  SpO2: 96% (08 Jun 2023 06:30) (96% - 99%)    Parameters below as of 08 Jun 2023 06:30  Patient On (Oxygen Delivery Method): room air      I&O's Detail    07 Jun 2023 07:01  -  08 Jun 2023 07:00  --------------------------------------------------------  IN:    dextrose 5% + sodium chloride 0.45% w/ Additives: 2400 mL    IV PiggyBack: 200 mL    Oral Fluid: 610 mL  Total IN: 3210 mL    OUT:    Voided (mL): 2825 mL  Total OUT: 2825 mL    Total NET: 385 mL        Daily     Daily     LABS:                        12.4   8.69  )-----------( 264      ( 08 Jun 2023 07:24 )             36.8     06-07    139  |  104  |  6<L>  ----------------------------<  96  4.3   |  24  |  0.69    Ca    8.6      07 Jun 2023 07:29  Phos  3.3     06-07  Mg     2.0     06-07        PHYSICAL EXAM:  General: laying in bed, NAD  CV: S1, S2  Respiratory: non-labored   Abdomen: soft, nondistended, TTP to LLQ  Ext: no gross deformities

## 2023-06-09 NOTE — DISCHARGE NOTE PROVIDER - CARE PROVIDER_API CALL
Deniz Topete  Colon/Rectal Surgery  [address], [address]  [place] [address]  Phone: [phone]  Fax: [phone]  Follow Up Time:

## 2023-06-09 NOTE — DISCHARGE NOTE NURSING/CASE MANAGEMENT/SOCIAL WORK - NSDCVIVACCINE_GEN_ALL_CORE_FT
Tdap; 17-Mar-2016 01:36; Elisa Valdez (RN); [employer]; [id number]; IntraMuscular; Deltoid Right.; 0.5 milliLiter(s); VIS (VIS Published: 09-May-2013, VIS Presented: 17-Mar-2016);

## 2023-07-05 NOTE — PROGRESS NOTE ADULT - ATTENDING SUPERVISION STATEMENT
-Recommend conservative measures with use of daily compression hose (20-30 mmHg), lower extremity elevation, regular exercise, weight management, low-sodium diet, and diligent skin care.   -No surgical intervention.   -Follow up as needed. - Instructed to call the office in the interim with any questions, concerns, or new symptoms. Venous Insufficiency   AMBULATORY CARE:   Venous insufficiency  is a condition that prevents blood from flowing out of your legs and back to your heart. Veins contain valves that help blood flow in one direction. Venous insufficiency means the valves do not close correctly or fully. Blood flows back and pools in your leg. This can cause problems such as varicose veins. Venous insufficiency may also be called chronic venous insufficiency or venous stasis. Common signs and symptoms:   Visible veins on your legs that may be small and red or large, thick, and blue         Swelling in your ankles or calves         Changes in skin color, such as dark or purple skin    An ulcer (open sore) on your leg    Leg pain that is worse when you are menstruating (women) or when you stand, and better when you elevate your legs    Burning or itching    Cramps that happen at night    Thick, hard skin on your legs and ankles    Feeling of heaviness in your legs    Seek care immediately if:   You have a wound that does not heal or is infected. You have an injury that has broken your skin and caused your varicose veins to bleed. Your leg is swollen and hard. You have pain in your leg that does not go away or gets worse. Your legs or feet are turning blue or black. Your leg feels warm, tender, and painful. It may look swollen and red. Call your doctor if:   You have a fever. You have varicose veins and they are painful. You have new or worsening leg pain, swelling, or redness. You have new or worsening ulcers or other sores on your leg.     You have questions or concerns about
your condition or care. Treatment  may include any of the following:  Medicine  may be given to improve blood flow. The medicines may thin your blood or reduce swelling to help blood flow. You may also need medicine to treat a bacterial infection. Ablation  is a procedure used to close varicose veins. A catheter is guided until it is near the vein. A device will then be guided to the area. The device may produce energy through radiofrequency or a laser. The energy creates heat that will close the blood vessel. Sclerotherapy  is a procedure used to fade visible veins. Your healthcare provider will inject a liquid into a spider vein or varicose vein. The liquid causes irritation in the vein. The vein swells and sticks together. Your body will then absorb the vein. Surgery  may be needed if other treatments do not work. Surgery may be used to repair a leg vein valve or to clip or tie off a vein so blood cannot flow through it. You may need to have a veins removed during surgery called stripping. Surgery may be used to bypass (go around) the damaged vein. Blood will flow through a vein transplanted from another part of your body. Manage your symptoms:   Wear pressure stockings as directed. Pressure stockings help keep blood from pooling in your leg veins. Your healthcare provider can prescribe stockings that are right for you. Do not buy over-the-counter pressure stockings unless your healthcare provider says it is okay. They may not fit correctly or may have elastic that cuts off your circulation. Ask your healthcare provider when to start wearing pressure stockings and how long to wear them each day. Do not sit or stand for long periods of time. If you have to sit for a long time, flex and extend your legs, feet, and ankles. Do this about 10 times every 30 minutes to help keep blood flowing. If you have to stand for a long time, take breaks and sit with your legs elevated.     Elevate your
legs.  Elevate your legs above the level of your heart to reduce swelling. Your healthcare provider may recommend that you keep your legs elevated for 30 minutes at a time. You may need to do this 3 to 4 times per day, or more if your healthcare provider recommends. Do not smoke. Nicotine and other chemicals in cigarettes and cigars can cause blood vessel damage. Ask your healthcare provider for information if you currently smoke and need help to quit. E-cigarettes or smokeless tobacco still contain nicotine. Talk to your healthcare provider before you use these products. Reach or maintain a healthy weight. Extra weight can make venous insufficiency worse. Ask your healthcare provider what a healthy weight is for you. He or she can help you create a weight loss plan if you need to lose weight. Exercise as directed. Walking can help increase blood flow in your calves. Ask your healthcare provider how much exercise you need each day and which exercises are best for you. Care for your skin. Keep your skin clean. Do not use any soaps or lotions that may dry your skin. For example, do not use products that contain fragrance or alcohol. If you have a skin ulcer, your healthcare provider may recommend a wet-to-dry bandage. To do this, apply a wet bandage to your wound and allow it to dry. This will help remove drainage from your wound each time you change the bandage. Your healthcare provider will tell you how often to change your bandage and which kind of bandage to use. Check your wound for signs of infection, such as swelling or pus. Go to physical therapy (PT) as directed. A physical therapist can help you increase movement and range of motion in your legs. Follow up with your doctor as directed:  Write down your questions so you remember to ask them during your visits.   © Copyright [employer] 2022 Information is for End User's use only and may not be sold, redistributed or otherwise
used for commercial purposes. The above information is an  only. It is not intended as medical advice for individual conditions or treatments. Talk to your doctor, nurse or pharmacist before following any medical regimen to see if it is safe and effective for you.
Resident
Resident

## 2024-03-22 NOTE — PATIENT PROFILE ADULT - PATIENT'S GENDER IDENTITY
FAMILY HISTORY:  Father  Still living? No  Family history of leukemia, Age at diagnosis: Age Unknown    Sibling  Still living? Unknown  Family history of malignant neoplasm of colon, Age at diagnosis: Age Unknown  Family hx of prostate cancer, Age at diagnosis: Age Unknown    
Female

## 2024-06-04 NOTE — ED ADULT NURSE NOTE - PAIN: PRESENCE, MLM
Medication: fluticasone-salmeterol (Advair Diskus) 250-50 MCG/ACT inhaler    Last office visit date: 02/14/2024  Next appointment scheduled?: Yes   Number of refills given: 0 - Refills on file.    Medication: albuterol 108 (90 Base) MCG/ACT inhaler  passed protocol.   Last office visit date: 02/14/2024  Next appointment scheduled?: Yes   Number of refills given: 3    [facility] texted and rx was sent to pharmacy.   complains of pain/discomfort